# Patient Record
Sex: FEMALE | Race: WHITE | Employment: FULL TIME | URBAN - METROPOLITAN AREA
[De-identification: names, ages, dates, MRNs, and addresses within clinical notes are randomized per-mention and may not be internally consistent; named-entity substitution may affect disease eponyms.]

---

## 2017-08-22 PROBLEM — Z53.20 COLONOSCOPY REFUSED: Status: ACTIVE | Noted: 2017-08-22

## 2017-08-22 PROBLEM — Z01.84 IMMUNITY STATUS TESTING: Status: ACTIVE | Noted: 2017-08-22

## 2017-08-22 PROCEDURE — 88175 CYTOPATH C/V AUTO FLUID REDO: CPT | Performed by: FAMILY MEDICINE

## 2017-10-07 PROCEDURE — 86765 RUBEOLA ANTIBODY: CPT | Performed by: FAMILY MEDICINE

## 2017-10-07 PROCEDURE — 86762 RUBELLA ANTIBODY: CPT | Performed by: FAMILY MEDICINE

## 2017-10-07 PROCEDURE — 86735 MUMPS ANTIBODY: CPT | Performed by: FAMILY MEDICINE

## 2017-10-10 PROBLEM — E55.9 VITAMIN D DEFICIENCY: Status: ACTIVE | Noted: 2017-10-10

## 2022-02-28 ENCOUNTER — APPOINTMENT (OUTPATIENT)
Dept: CT IMAGING | Facility: HOSPITAL | Age: 72
End: 2022-02-28
Attending: EMERGENCY MEDICINE
Payer: MEDICARE

## 2022-02-28 ENCOUNTER — HOSPITAL ENCOUNTER (EMERGENCY)
Facility: HOSPITAL | Age: 72
Discharge: HOME | End: 2022-02-28
Attending: EMERGENCY MEDICINE
Payer: MEDICARE

## 2022-02-28 VITALS
SYSTOLIC BLOOD PRESSURE: 169 MMHG | HEART RATE: 98 BPM | HEIGHT: 61 IN | RESPIRATION RATE: 15 BRPM | OXYGEN SATURATION: 96 % | DIASTOLIC BLOOD PRESSURE: 77 MMHG | TEMPERATURE: 98 F | WEIGHT: 164 LBS | BODY MASS INDEX: 30.96 KG/M2

## 2022-02-28 DIAGNOSIS — R07.9 ACUTE CHEST PAIN: Primary | ICD-10-CM

## 2022-02-28 LAB
LIPASE SERPL-CCNC: 81 U/L (ref 73–393)
SARS-COV-2 RNA RESP QL NAA+PROBE: NOT DETECTED
TROPONIN I HIGH SENSITIVITY: 4 NG/L

## 2022-02-28 PROCEDURE — 71275 CT ANGIOGRAPHY CHEST: CPT | Performed by: EMERGENCY MEDICINE

## 2022-02-28 PROCEDURE — 84484 ASSAY OF TROPONIN QUANT: CPT | Performed by: EMERGENCY MEDICINE

## 2022-02-28 PROCEDURE — 83690 ASSAY OF LIPASE: CPT | Performed by: EMERGENCY MEDICINE

## 2022-02-28 PROCEDURE — 96360 HYDRATION IV INFUSION INIT: CPT

## 2022-02-28 PROCEDURE — 93010 ELECTROCARDIOGRAM REPORT: CPT

## 2022-02-28 PROCEDURE — 93005 ELECTROCARDIOGRAM TRACING: CPT

## 2022-02-28 PROCEDURE — 99284 EMERGENCY DEPT VISIT MOD MDM: CPT

## 2022-02-28 PROCEDURE — 99285 EMERGENCY DEPT VISIT HI MDM: CPT

## 2022-02-28 PROCEDURE — 96361 HYDRATE IV INFUSION ADD-ON: CPT

## 2022-02-28 RX ORDER — SODIUM CHLORIDE 9 MG/ML
125 INJECTION, SOLUTION INTRAVENOUS CONTINUOUS
Status: DISCONTINUED | OUTPATIENT
Start: 2022-02-28 | End: 2022-02-28

## 2022-02-28 RX ORDER — IOHEXOL 350 MG/ML
70 INJECTION, SOLUTION INTRAVENOUS
Status: COMPLETED | OUTPATIENT
Start: 2022-02-28 | End: 2022-02-28

## 2022-02-28 NOTE — ED INITIAL ASSESSMENT (HPI)
Pt here with c.o elevated D dimer, states been feeling bad over the weekend. Pt states having some chest tightness after they told her that her D dimer was elevated, and states its probably her anxiety.  Pt had EKG done earlier today in clinic

## 2022-03-01 LAB
ATRIAL RATE: 106 BPM
P AXIS: 65 DEGREES
P-R INTERVAL: 128 MS
Q-T INTERVAL: 348 MS
QRS DURATION: 104 MS
QTC CALCULATION (BEZET): 462 MS
R AXIS: -74 DEGREES
T AXIS: 16 DEGREES
VENTRICULAR RATE: 106 BPM

## 2024-07-06 ENCOUNTER — HOSPITAL ENCOUNTER (EMERGENCY)
Facility: HOSPITAL | Age: 74
Discharge: HOME | End: 2024-07-06
Attending: EMERGENCY MEDICINE
Payer: MEDICARE

## 2024-07-06 ENCOUNTER — APPOINTMENT (OUTPATIENT)
Dept: CT IMAGING | Facility: HOSPITAL | Age: 74
End: 2024-07-06
Attending: EMERGENCY MEDICINE
Payer: MEDICARE

## 2024-07-06 VITALS
DIASTOLIC BLOOD PRESSURE: 48 MMHG | WEIGHT: 150 LBS | SYSTOLIC BLOOD PRESSURE: 142 MMHG | OXYGEN SATURATION: 95 % | BODY MASS INDEX: 28.32 KG/M2 | TEMPERATURE: 98 F | HEIGHT: 61 IN | RESPIRATION RATE: 18 BRPM | HEART RATE: 90 BPM

## 2024-07-06 DIAGNOSIS — R10.13 EPIGASTRIC PAIN: Primary | ICD-10-CM

## 2024-07-06 LAB
ALBUMIN SERPL-MCNC: 3.8 G/DL (ref 3.4–5)
ALBUMIN/GLOB SERPL: 1 {RATIO} (ref 1–2)
ALP LIVER SERPL-CCNC: 90 U/L
ALT SERPL-CCNC: 22 U/L
ANION GAP SERPL CALC-SCNC: 6 MMOL/L (ref 0–18)
AST SERPL-CCNC: 16 U/L (ref 15–37)
ATRIAL RATE: 112 BPM
BASOPHILS # BLD AUTO: 0.03 X10(3) UL (ref 0–0.2)
BASOPHILS NFR BLD AUTO: 0.3 %
BILIRUB SERPL-MCNC: 0.4 MG/DL (ref 0.1–2)
BILIRUB UR QL STRIP.AUTO: NEGATIVE
BUN BLD-MCNC: 9 MG/DL (ref 9–23)
CALCIUM BLD-MCNC: 9.4 MG/DL (ref 8.5–10.1)
CHLORIDE SERPL-SCNC: 108 MMOL/L (ref 98–112)
CLARITY UR REFRACT.AUTO: CLEAR
CO2 SERPL-SCNC: 25 MMOL/L (ref 21–32)
COLOR UR AUTO: COLORLESS
CREAT BLD-MCNC: 0.97 MG/DL
D DIMER PPP FEU-MCNC: 0.45 UG/ML FEU (ref ?–0.72)
EGFRCR SERPLBLD CKD-EPI 2021: 62 ML/MIN/1.73M2 (ref 60–?)
EOSINOPHIL # BLD AUTO: 0 X10(3) UL (ref 0–0.7)
EOSINOPHIL NFR BLD AUTO: 0 %
ERYTHROCYTE [DISTWIDTH] IN BLOOD BY AUTOMATED COUNT: 13.2 %
GLOBULIN PLAS-MCNC: 4 G/DL (ref 2.8–4.4)
GLUCOSE BLD-MCNC: 142 MG/DL (ref 70–99)
GLUCOSE UR STRIP.AUTO-MCNC: NORMAL MG/DL
HCT VFR BLD AUTO: 44.3 %
HGB BLD-MCNC: 14.8 G/DL
IMM GRANULOCYTES # BLD AUTO: 0.04 X10(3) UL (ref 0–1)
IMM GRANULOCYTES NFR BLD: 0.4 %
KETONES UR STRIP.AUTO-MCNC: 10 MG/DL
LACTATE SERPL-SCNC: 2.3 MMOL/L (ref 0.4–2)
LEUKOCYTE ESTERASE UR QL STRIP.AUTO: NEGATIVE
LYMPHOCYTES # BLD AUTO: 1.06 X10(3) UL (ref 1–4)
LYMPHOCYTES NFR BLD AUTO: 11.6 %
MCH RBC QN AUTO: 27.7 PG (ref 26–34)
MCHC RBC AUTO-ENTMCNC: 33.4 G/DL (ref 31–37)
MCV RBC AUTO: 83 FL
MONOCYTES # BLD AUTO: 0.47 X10(3) UL (ref 0.1–1)
MONOCYTES NFR BLD AUTO: 5.1 %
NEUTROPHILS # BLD AUTO: 7.55 X10 (3) UL (ref 1.5–7.7)
NEUTROPHILS # BLD AUTO: 7.55 X10(3) UL (ref 1.5–7.7)
NEUTROPHILS NFR BLD AUTO: 82.6 %
NITRITE UR QL STRIP.AUTO: NEGATIVE
OSMOLALITY SERPL CALC.SUM OF ELEC: 289 MOSM/KG (ref 275–295)
P AXIS: 71 DEGREES
P-R INTERVAL: 176 MS
PH UR STRIP.AUTO: 6.5 [PH] (ref 5–8)
PLATELET # BLD AUTO: 219 10(3)UL (ref 150–450)
POTASSIUM SERPL-SCNC: 3.6 MMOL/L (ref 3.5–5.1)
PROT SERPL-MCNC: 7.8 G/DL (ref 6.4–8.2)
PROT UR STRIP.AUTO-MCNC: NEGATIVE MG/DL
Q-T INTERVAL: 328 MS
QRS DURATION: 102 MS
QTC CALCULATION (BEZET): 447 MS
R AXIS: -61 DEGREES
RBC # BLD AUTO: 5.34 X10(6)UL
RBC UR QL AUTO: NEGATIVE
SODIUM SERPL-SCNC: 139 MMOL/L (ref 136–145)
SP GR UR STRIP.AUTO: 1.01 (ref 1–1.03)
T AXIS: 20 DEGREES
TROPONIN I SERPL HS-MCNC: 4 NG/L
UROBILINOGEN UR STRIP.AUTO-MCNC: NORMAL MG/DL
VENTRICULAR RATE: 112 BPM
WBC # BLD AUTO: 9.2 X10(3) UL (ref 4–11)

## 2024-07-06 PROCEDURE — 85379 FIBRIN DEGRADATION QUANT: CPT | Performed by: EMERGENCY MEDICINE

## 2024-07-06 PROCEDURE — 85025 COMPLETE CBC W/AUTO DIFF WBC: CPT | Performed by: EMERGENCY MEDICINE

## 2024-07-06 PROCEDURE — 96360 HYDRATION IV INFUSION INIT: CPT

## 2024-07-06 PROCEDURE — 99284 EMERGENCY DEPT VISIT MOD MDM: CPT

## 2024-07-06 PROCEDURE — 93005 ELECTROCARDIOGRAM TRACING: CPT

## 2024-07-06 PROCEDURE — 74177 CT ABD & PELVIS W/CONTRAST: CPT | Performed by: EMERGENCY MEDICINE

## 2024-07-06 PROCEDURE — 80053 COMPREHEN METABOLIC PANEL: CPT | Performed by: EMERGENCY MEDICINE

## 2024-07-06 PROCEDURE — 93010 ELECTROCARDIOGRAM REPORT: CPT

## 2024-07-06 PROCEDURE — 82272 OCCULT BLD FECES 1-3 TESTS: CPT

## 2024-07-06 PROCEDURE — 96361 HYDRATE IV INFUSION ADD-ON: CPT

## 2024-07-06 PROCEDURE — 83605 ASSAY OF LACTIC ACID: CPT | Performed by: EMERGENCY MEDICINE

## 2024-07-06 PROCEDURE — 84484 ASSAY OF TROPONIN QUANT: CPT | Performed by: EMERGENCY MEDICINE

## 2024-07-06 PROCEDURE — 81003 URINALYSIS AUTO W/O SCOPE: CPT | Performed by: EMERGENCY MEDICINE

## 2024-07-06 RX ORDER — SODIUM CHLORIDE 9 MG/ML
125 INJECTION, SOLUTION INTRAVENOUS CONTINUOUS
Status: DISCONTINUED | OUTPATIENT
Start: 2024-07-06 | End: 2024-07-06

## 2024-07-06 NOTE — ED PROVIDER NOTES
Patient Seen in: [facility] Emergency Department      History     Chief Complaint   Patient presents with    Abdomen/Flank Pain    Chest Pain     Stated Complaint: abdominal pain, cp    Subjective:   HPI    This is a 73-year-old female past medical history of obesity who presents with multiple complaints.  She states for the last 2 months she has been experiencing some epigastric discomfort and then she has multiple loose stools.  She does report she is under a lot of stress at work.  Then 2 days ago she felt like she woke up during the night was having a panic attack.  Her heart rate was 115.  Last night she started having some epigastric pain which started during the night.  She again had a bunch of soft bowel movements.  Denies hematemesis hematochezia.  She is not on anticoagulants.  She has never had a colonoscopy.  No fevers chills.  Cough or cold symptoms.  Per abdominal surgery close .  She went to urgent care today.  They told her that she had an abnormal EKG and told her to come to the emergency room.  She has no known cardiac history.  She is very anxious.  She does report that she had some chest tightness while I was talking to her in the room today.  She does not smoke tobacco.  She rarely drinks alcohol.  No illicit drug use.  She takes NSAIDs about twice a month.  1 cup of caffeine daily.  She presents here with her  from home for further evaluation.        Objective:   Past Medical History:    Anxiety    Anxiety state, unspecified    Menopause    in her 50's    Osteoporosis              Past Surgical History:   Procedure Laterality Date      x3    Cataract extraction extracapsular w/ intraocular lens implantation Bilateral 2014    Lasik Bilateral     Removal of tonsils,13+ y/o                  Social History     Socioeconomic History    Marital status:     Number of children: 3   Occupational History    Occupation: Teacher   Tobacco Use    Smoking status:  Never    Smokeless tobacco: Never    Tobacco comments:     caffeine: minimal   Vaping Use    Vaping status: Never Used   Substance and Sexual Activity    Alcohol use: Yes     Comment: Social    Drug use: No    Sexual activity: Yes     Partners: Male   Other Topics Concern    Exercise Yes     Comment: X 3 DAYS A WEEK     Seat Belt Yes    Self-Exams Yes   Social History Narrative    .  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  She is a non-smoker.  She drinks alcohol socially.  Physically active with regular exercise.  3 children.              Review of Systems    Positive for stated Chief Complaint: Abdomen/Flank Pain and Chest Pain    Other systems are as noted in HPI.  Constitutional and vital signs reviewed.      All other systems reviewed and negative except as noted above.    Physical Exam     ED Triage Vitals [07/06/24 0948]   /82   Pulse 118   Resp 20   Temp 98 °F (36.7 °C)   Temp src    SpO2 97 %   O2 Device None (Room air)       Current Vitals:   Vital Signs  BP: 142/48  Pulse: 90  Resp: 18  Temp: 98 °F (36.7 °C)  MAP (mmHg): 75    Oxygen Therapy  SpO2: 95 %  O2 Device: None (Room air)            Physical Exam    GENERAL: Awake, alert oriented x3, nontoxic appearing.   SKIN: Normal, warm, and dry.  HEENT:  Pupils equally round and reactive to light. Conjuctiva clear.  Oropharynx is clear and moist.   Lungs: Clear to auscultation bilaterally with no rales, no retractions, and no wheezing.  HEART:  Regular rate and rhythm. S1 and S2. No murmurs, no rubs or gallops.   ABDOMEN: Soft, nontender and nondistended. Normoactive bowel sounds. No rebound. No guarding.   Rectal: Heme-negative  EXTREMITIES: Warm with brisk capillary refill.         ED Course     Labs Reviewed   COMP METABOLIC PANEL (14) - Abnormal; Notable for the following components:       Result Value    Glucose 142 (*)     All other components within normal limits    LACTIC ACID, PLASMA - Abnormal; Notable for the following components:    Lactic Acid 2.3 (*)     All other components within normal limits   URINALYSIS, ROUTINE - Abnormal; Notable for the following components:    Urine Color Colorless (*)     Ketones Urine 10 (*)     All other components within normal limits   CBC W/ DIFFERENTIAL - Abnormal; Notable for the following components:    RBC 5.34 (*)     All other components within normal limits   TROPONIN I HIGH SENSITIVITY - Normal   D-DIMER - Normal   CBC WITH DIFFERENTIAL WITH PLATELET    Narrative:     The following orders were created for panel order CBC With Differential With Platelet.  Procedure                               Abnormality         Status                     ---------                               -----------         ------                     CBC W/ DIFFERENTIAL[[id number]]          Abnormal            Final result                 Please view results for these tests on the individual orders.   RAINBOW DRAW LAVENDER   RAINBOW DRAW LIGHT GREEN   RAINBOW DRAW BLUE   RAINBOW DRAW GOLD     EKG    Rate, intervals and axes as noted on EKG Report.  Rate: 112  Rhythm: Sinus tachycardia  Reading: Incomplete right bundle blu block present on EKG from 2/28/2022.  Poor R wave progression.  No new acute changes.               CT ABDOMEN+PELVIS(CONTRAST ONLY)(CPT=74177)    Result Date: 7/6/2024  PROCEDURE:  CT ABDOMEN+PELVIS (CONTRAST ONLY) (CPT=74177)  COMPARISON:  None.  INDICATIONS:  abdominal pain, cp  TECHNIQUE:  CT scanning was performed from the dome of the diaphragm to the pubic symphysis with non-ionic intravenous contrast material. Post contrast coronal MPR imaging was performed.  Dose reduction techniques were used. Dose information is transmitted to the ACR (American College of Radiology) NRDR (National Radiology Data Registry) which includes the Dose Index Registry.  PATIENT STATED HISTORY:(As transcribed by Technologist)  Patient presents with  upper mid abdominal pain off and on for couple of months but worse since last night.   CONTRAST USED:  80cc of Isovue 370  FINDINGS:  LIVER:  No enlargement, atrophy, abnormal density, or significant focal lesion.  BILIARY:  No visible dilatation or calcification.  PANCREAS:  No lesion, fluid collection, ductal dilatation, or atrophy.  SPLEEN:  No enlargement or focal lesion.  KIDNEYS:  No mass, obstruction, or calcification.  Parapelvic cysts measures 13 and 10 mm in size. ADRENALS:  No mass or enlargement.  AORTA/VASCULAR:  No aneurysm or dissection.  RETROPERITONEUM:  No mass or adenopathy.  BOWEL/MESENTERY:  There is diverticulosis of the sigmoid colon without evidence of diverticulitis. ABDOMINAL WALL:  No mass or hernia.  URINARY BLADDER:  No visible focal wall thickening, lesion, or calculus.  PELVIC NODES:  No adenopathy.  PELVIC ORGANS:  No visible mass.  Pelvic organs appropriate for patient age.  BONES:  No bony lesion or fracture.  LUNG BASES:  No visible pulmonary or pleural disease.  Subsegmental atelectasis at the right lung base. OTHER:  Negative.             CONCLUSION:  No evidence of acute abdominal pelvic process.   LOCATION:  [facility]   Dictated by (CST): Chandra Acevedo MD on 7/06/2024 at 11:47 AM     Finalized by (CST): Chandra Acevedo MD on 7/06/2024 at 11:51 AM                MDM        This is a 73-year-old female past medical history of obesity who presents with multiple complaints.  She states for the last 2 months she has been experiencing some epigastric discomfort and then she has multiple loose stools.  She does report she is under a lot of stress at work.  Then 2 days ago she felt like she woke up during the night was having a panic attack.  Her heart rate was 115.  Last night she started having some epigastric pain which started during the night.  Differential includes pancreatitis, cardiac ischemia, viral enteritis.          Patient placed on cardiac monitor, continuous pulse oximetry  and IV line was established of normal saline.  Patient was given a 1 L bolus.  Basic labs were obtained.  Basic labs were obtained.  CBC: White blood cell count 9.2.  Hemoglobin 14.8.  Platelets 219.  CMP: BUN 9.  Creatinine 0.9.  Glucose 142.  Bicarb 25.  Troponin negative.  D-dimer negative.  Lactic acid slightly elevated 2.3 but patient has been having diarrhea most likely secondary to dehydration.  Do not feel this is sepsis.    Urinalysis: Negative    CT scan abdomen/pelvis was obtained and showed no evidence of acute intra-abdominal process.  Findings were all discussed with patient.    Recommend she follow-up with her primary care physician.  Avoid alcohol, caffeine in moderation.  [place] diet.  Pepcid 20 mg daily for 2 weeks.  Continue regular medications.  Return if worse.  Patient discharged home in good condition with .          Disposition and Plan     Clinical Impression:  1. Epigastric pain         Disposition:  Discharge  7/6/2024 12:25 pm    Follow-up:  Brianna Joya MD  [address] [address]  [phone]    Follow up on 7/8/2024            Medications Prescribed:  Current Discharge Medication List

## 2024-07-06 NOTE — ED INITIAL ASSESSMENT (HPI)
Pt states she woke up in the middle of the night with CP and her heart racing. Pt c/o mainly of abdominal pain, not diarrhea but going frequently.. pt states she has been under a lot of stress. Pt went to IC and sent to ED for work up.

## 2024-07-06 NOTE — DISCHARGE INSTRUCTIONS
[place] diet, avoids spicy or fatty foods  Pepcid 20 mg daily  Drink plenty of fluids  Return if worse  Follow-up with primary care physician on Monday for further workup and evaluation.  May need gastroenterology referral.

## 2025-06-30 ENCOUNTER — HOSPITAL ENCOUNTER (EMERGENCY)
Facility: HOSPITAL | Age: 75
Discharge: HOME | End: 2025-06-30
Attending: EMERGENCY MEDICINE
Payer: MEDICARE

## 2025-06-30 ENCOUNTER — APPOINTMENT (OUTPATIENT)
Dept: CT IMAGING | Facility: HOSPITAL | Age: 75
End: 2025-06-30
Attending: EMERGENCY MEDICINE
Payer: MEDICARE

## 2025-06-30 VITALS
RESPIRATION RATE: 19 BRPM | BODY MASS INDEX: 31.15 KG/M2 | OXYGEN SATURATION: 100 % | TEMPERATURE: 98 F | WEIGHT: 165 LBS | SYSTOLIC BLOOD PRESSURE: 139 MMHG | HEIGHT: 61 IN | HEART RATE: 116 BPM | DIASTOLIC BLOOD PRESSURE: 55 MMHG

## 2025-06-30 DIAGNOSIS — R42 DIZZINESS: Primary | ICD-10-CM

## 2025-06-30 LAB
ALBUMIN SERPL-MCNC: 4.2 G/DL (ref 3.2–4.8)
ALBUMIN/GLOB SERPL: 1.5 {RATIO} (ref 1–2)
ALP LIVER SERPL-CCNC: 81 U/L (ref 55–142)
ALT SERPL-CCNC: 14 U/L (ref 10–49)
ANION GAP SERPL CALC-SCNC: 10 MMOL/L (ref 0–18)
AST SERPL-CCNC: 27 U/L (ref ?–34)
BASOPHILS # BLD AUTO: 0.04 X10(3) UL (ref 0–0.2)
BASOPHILS NFR BLD AUTO: 0.4 %
BILIRUB SERPL-MCNC: 0.5 MG/DL (ref 0.2–1.1)
BUN BLD-MCNC: 9 MG/DL (ref 9–23)
CALCIUM BLD-MCNC: 9.5 MG/DL (ref 8.7–10.6)
CHLORIDE SERPL-SCNC: 104 MMOL/L (ref 98–112)
CO2 SERPL-SCNC: 22 MMOL/L (ref 21–32)
CREAT BLD-MCNC: 0.87 MG/DL (ref 0.55–1.02)
EGFRCR SERPLBLD CKD-EPI 2021: 70 ML/MIN/1.73M2 (ref 60–?)
EOSINOPHIL # BLD AUTO: 0.06 X10(3) UL (ref 0–0.7)
EOSINOPHIL NFR BLD AUTO: 0.7 %
ERYTHROCYTE [DISTWIDTH] IN BLOOD BY AUTOMATED COUNT: 13.2 %
GLOBULIN PLAS-MCNC: 2.8 G/DL (ref 2–3.5)
GLUCOSE BLD-MCNC: 118 MG/DL (ref 70–99)
GLUCOSE BLD-MCNC: 130 MG/DL (ref 70–99)
HCT VFR BLD AUTO: 44.3 % (ref 35–48)
HGB BLD-MCNC: 14.4 G/DL (ref 12–16)
IMM GRANULOCYTES # BLD AUTO: 0.04 X10(3) UL (ref 0–1)
IMM GRANULOCYTES NFR BLD: 0.4 %
LYMPHOCYTES # BLD AUTO: 1.52 X10(3) UL (ref 1–4)
LYMPHOCYTES NFR BLD AUTO: 16.5 %
MCH RBC QN AUTO: 27.3 PG (ref 26–34)
MCHC RBC AUTO-ENTMCNC: 32.5 G/DL (ref 31–37)
MCV RBC AUTO: 84.1 FL (ref 80–100)
MONOCYTES # BLD AUTO: 0.52 X10(3) UL (ref 0.1–1)
MONOCYTES NFR BLD AUTO: 5.6 %
NEUTROPHILS # BLD AUTO: 7.04 X10 (3) UL (ref 1.5–7.7)
NEUTROPHILS # BLD AUTO: 7.04 X10(3) UL (ref 1.5–7.7)
NEUTROPHILS NFR BLD AUTO: 76.4 %
OSMOLALITY SERPL CALC.SUM OF ELEC: 282 MOSM/KG (ref 275–295)
PLATELET # BLD AUTO: 180 10(3)UL (ref 150–450)
POTASSIUM SERPL-SCNC: 4.3 MMOL/L (ref 3.5–5.1)
PROT SERPL-MCNC: 7 G/DL (ref 5.7–8.2)
RBC # BLD AUTO: 5.27 X10(6)UL (ref 3.8–5.3)
SODIUM SERPL-SCNC: 136 MMOL/L (ref 136–145)
TROPONIN I SERPL HS-MCNC: <3 NG/L (ref ?–34)
WBC # BLD AUTO: 9.2 X10(3) UL (ref 4–11)

## 2025-06-30 PROCEDURE — 85025 COMPLETE CBC W/AUTO DIFF WBC: CPT

## 2025-06-30 PROCEDURE — 80053 COMPREHEN METABOLIC PANEL: CPT

## 2025-06-30 PROCEDURE — 70450 CT HEAD/BRAIN W/O DYE: CPT | Performed by: EMERGENCY MEDICINE

## 2025-06-30 PROCEDURE — 82962 GLUCOSE BLOOD TEST: CPT

## 2025-06-30 PROCEDURE — 99285 EMERGENCY DEPT VISIT HI MDM: CPT

## 2025-06-30 PROCEDURE — 93010 ELECTROCARDIOGRAM REPORT: CPT

## 2025-06-30 PROCEDURE — 85025 COMPLETE CBC W/AUTO DIFF WBC: CPT | Performed by: EMERGENCY MEDICINE

## 2025-06-30 PROCEDURE — 96360 HYDRATION IV INFUSION INIT: CPT

## 2025-06-30 PROCEDURE — 80053 COMPREHEN METABOLIC PANEL: CPT | Performed by: EMERGENCY MEDICINE

## 2025-06-30 PROCEDURE — 93005 ELECTROCARDIOGRAM TRACING: CPT

## 2025-06-30 PROCEDURE — 84484 ASSAY OF TROPONIN QUANT: CPT | Performed by: EMERGENCY MEDICINE

## 2025-06-30 PROCEDURE — 99284 EMERGENCY DEPT VISIT MOD MDM: CPT

## 2025-06-30 NOTE — ED PROVIDER NOTES
Patient Seen in: [facility] Emergency Department        History  No chief complaint on file.    Stated Complaint: dizziness    Subjective:   HPI            Patient is a 74-year-old female who presents by ambulance for evaluation of dizziness today.  She woke up this morning initially feeling okay.  Prior to arrival she apparently had a phone call that stressed her out and when she stood up after that she felt lightheaded and dizzy.  It was not a room spinning sensation.  Did not really feel like she was going to pass out, just \"dizzy\".  She laid down for a while hoping the symptoms would resolve but then she stood up again after that and they were still there so she called the ambulance.  On arrival she is feeling somewhat better.  No focal weakness.  No headache.      Objective:     Past Medical History:    Anxiety    Anxiety state, unspecified    Menopause    in her 50's    Osteoporosis              Past Surgical History:   Procedure Laterality Date      x3    Cataract extraction extracapsular w/ intraocular lens implantation Bilateral 2014    Lasik Bilateral     Removal of tonsils,13+ y/o                  Social History     Socioeconomic History    Marital status:     Number of children: 3   Occupational History    Occupation: Teacher   Tobacco Use    Smoking status: Never    Smokeless tobacco: Never    Tobacco comments:     caffeine: minimal   Vaping Use    Vaping status: Never Used   Substance and Sexual Activity    Alcohol use: Yes     Comment: Social    Drug use: No    Sexual activity: Yes     Partners: Male   Other Topics Concern    Exercise Yes     Comment: X 3 DAYS A WEEK     Seat Belt Yes    Self-Exams Yes   Social History Narrative    .  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  She is a non-smoker.  She drinks alcohol socially.  Physically active with regular exercise.  3 children.                                 Physical Exam    ED Triage Vitals [06/30/25 1145]   /75   Pulse 104   Resp 16   Temp 97.9 °F (36.6 °C)   Temp src Temporal   SpO2 100 %   O2 Device None (Room air)       Current Vitals:   Vital Signs  BP: 139/55  Pulse: 116  Resp: 19  Temp: 97.9 °F (36.6 °C)  Temp src: Temporal  MAP (mmHg): 84    Oxygen Therapy  SpO2: 100 %  O2 Device: None (Room air)            Physical Exam  Vitals and nursing note reviewed.   Constitutional:       Appearance: She is well-developed.   HENT:      Head: Normocephalic and atraumatic.   Eyes:      Conjunctiva/sclera: Conjunctivae normal.      Pupils: Pupils are equal, round, and reactive to light.   Cardiovascular:      Rate and Rhythm: Normal rate and regular rhythm.      Heart sounds: Normal heart sounds.   Pulmonary:      Effort: Pulmonary effort is normal.      Breath sounds: Normal breath sounds.   Abdominal:      General: Bowel sounds are normal.      Palpations: Abdomen is soft.   Musculoskeletal:         General: Normal range of motion.   Skin:     General: Skin is warm and dry.   Neurological:      Mental Status: She is alert and oriented to person, place, and time.      Comments: Cranial nerves II through XII intact.  Speech is fluent and clear with no aphasia or dysarthria.  Strength is 5 out of 5 bilateral upper lower extremities.  NIH is 0.                 ED Course  Labs Reviewed   COMP METABOLIC PANEL (14) - Abnormal; Notable for the following components:       Result Value    Glucose 130 (*)     All other components within normal limits   POCT GLUCOSE - Abnormal; Notable for the following components:    POC Glucose 118 (*)     All other components within normal limits   TROPONIN I HIGH SENSITIVITY - Normal   CBC WITH DIFFERENTIAL WITH PLATELET     EKG    Rate, intervals and axes as noted on EKG Report.  Rate: 100  Rhythm: Sinus Rhythm  Reading: Sinus rhythm.  1 PVC.  No ST segment or T wave changes.  No old available for comparison.               CT BRAIN OR HEAD (CPT=70450)  Result Date: 6/30/2025  CT BRAIN OR HEAD (CPT=70450) CLINICAL INDICATION: 74 years-old-Female; dizziness COMPARISON: None TECHNIQUE: CT of the brain was performed using axial slices from the base of the skull to the vertex without contrast including bone window settings after initial  films.  CT dose reduction techniques utilized.  FINDINGS:  There is no evidence of acute intracranial hemorrhage, mass, vascular territory acute infarction, or extraaxial fluid on this noncontrast CT examination. No mass effect or midline shift is seen of the midline structures.  There is no hydrocephalus. The ventricular system appears to be symmetric and normal in size.  The visualized paranasal sinuses and mastoid air cells are clear. The bony calvarium appears to be intact.     IMPRESSION: 1. No evidence of acute intracranial hemorrhage, extraaxial fluid collection, or hydrocephalus as above. Note:  Acute ischemia may be occult on CT for up to 48 hours and if there is a persistent concern for acute ischemia recommend MRI with diffusion-weighted imaging. Electronically Verified and Signed by Attending Radiologist: Kristin Solitario MD 6/30/2025 2:08 PM Workstation: [id number]                      [facility]     74-year-old female presenting after episode of dizziness at home.  Not room spinning although did seem to get worse when she tried to stand up which raises the possibility of peripheral vertigo.  Differential also includes dehydration, trite abnormality, ICH, stroke.    Update at 2:30 PM.  Labs unremarkable.  Troponin is negative.  Patient was not orthostatic.  CT brain is normal with no evidence of acute stroke.  Patient is feeling much better in the room.  Will have her do an ambulation trial now.    Update at 2:45 PM.  Patient ambulated here and she felt comfortable, no dizziness.  She is comfortable going home.      Past Medical History-anxiety    Differential diagnosis before testing  included ICH, stroke, dehydration    Co-morbidities that add to the complexity of management include: None    Testing ordered during this visit included labs, CT brain    Radiographic images  I personally reviewed the radiographs and my individual interpretation shows no ICH or mass  I also reviewed the official reports that showed no ICH or mass            Disposition:        Discharge  I have discussed with the patient the results of test, differential diagnosis, treatment plan, warning signs and symptoms which should prompt immediate return.  They expressed understanding of these instructions and agrees to the following plan provided.  They were given written discharge instructions and agrees to return for any concerns and voiced understanding and all questions were answered.      Medical Decision Making      Disposition and Plan     Clinical Impression:  1. Dizziness         Disposition:  Discharge  6/30/2025  2:48 pm    Follow-up:  Brianna Joya MD  [address] [place] [id number]  [phone]    Follow up            Medications Prescribed:  Current Discharge Medication List                Supplementary Documentation:

## 2025-06-30 NOTE — ED INITIAL ASSESSMENT (HPI)
Patient arrives from home via EMS for c/o of dizziness that started about 1 hour ago. Pt states she was working at home and took a stressful phone call and felt dizzy and her heart rate went up. Pt states she went to lay down and felt better and then stood back up and felt dizzy again and called 911. Pt denies CP. Pt not on blood thinners.

## 2025-07-03 LAB
ATRIAL RATE: 100 BPM
P AXIS: 64 DEGREES
P-R INTERVAL: 154 MS
Q-T INTERVAL: 368 MS
QRS DURATION: 108 MS
QTC CALCULATION (BEZET): 474 MS
R AXIS: -48 DEGREES
T AXIS: 30 DEGREES
VENTRICULAR RATE: 100 BPM